# Patient Record
Sex: FEMALE | Race: WHITE | NOT HISPANIC OR LATINO | Employment: OTHER | URBAN - METROPOLITAN AREA
[De-identification: names, ages, dates, MRNs, and addresses within clinical notes are randomized per-mention and may not be internally consistent; named-entity substitution may affect disease eponyms.]

---

## 2019-12-05 ENCOUNTER — HOSPITAL ENCOUNTER (OUTPATIENT)
Dept: SURGERY | Facility: HOSPITAL | Age: 69
Setting detail: HOSPITAL OUTPATIENT SURGERY
Discharge: HOME | End: 2019-12-05
Attending: OPHTHALMOLOGY

## 2019-12-05 LAB — GLUCOSE BLD-MCNC: 128 MG/DL (ref 65–99)

## 2019-12-12 ENCOUNTER — HOSPITAL ENCOUNTER (OUTPATIENT)
Dept: SURGERY | Facility: HOSPITAL | Age: 69
Setting detail: HOSPITAL OUTPATIENT SURGERY
Discharge: HOME | End: 2019-12-12
Attending: OPHTHALMOLOGY

## 2019-12-12 LAB — GLUCOSE BLD-MCNC: 110 MG/DL (ref 65–99)

## 2021-05-19 ENCOUNTER — HOSPITAL ENCOUNTER (OUTPATIENT)
Dept: OTHER | Facility: HOSPITAL | Age: 71
Discharge: HOME | End: 2021-05-19

## 2021-08-03 ENCOUNTER — OFFICE VISIT (OUTPATIENT)
Dept: CARDIOLOGY | Facility: CLINIC | Age: 71
End: 2021-08-03

## 2021-08-03 VITALS
SYSTOLIC BLOOD PRESSURE: 142 MMHG | BODY MASS INDEX: 44.57 KG/M2 | WEIGHT: 227 LBS | HEIGHT: 60 IN | HEART RATE: 88 BPM | DIASTOLIC BLOOD PRESSURE: 80 MMHG

## 2021-08-03 DIAGNOSIS — R06.09 DYSPNEA ON EXERTION: Primary | ICD-10-CM

## 2021-08-03 DIAGNOSIS — E66.01 MORBID OBESITY WITH BMI OF 40.0-44.9, ADULT (HCC): ICD-10-CM

## 2021-08-03 DIAGNOSIS — R07.89 CHEST TIGHTNESS: ICD-10-CM

## 2021-08-03 PROCEDURE — 93000 ELECTROCARDIOGRAM COMPLETE: CPT | Performed by: INTERNAL MEDICINE

## 2021-08-03 PROCEDURE — 99204 OFFICE O/P NEW MOD 45 MIN: CPT | Performed by: INTERNAL MEDICINE

## 2021-08-03 RX ORDER — SERTRALINE HYDROCHLORIDE 100 MG/1
TABLET, FILM COATED ORAL DAILY
COMMUNITY
Start: 2021-06-29

## 2021-08-03 RX ORDER — LOSARTAN POTASSIUM 50 MG/1
TABLET ORAL DAILY
COMMUNITY
Start: 2021-06-29

## 2021-08-03 RX ORDER — ATORVASTATIN CALCIUM 40 MG/1
TABLET, FILM COATED ORAL
COMMUNITY
Start: 2021-06-12

## 2021-08-03 RX ORDER — DICYCLOMINE HYDROCHLORIDE 10 MG/1
10 CAPSULE ORAL
COMMUNITY

## 2021-08-03 RX ORDER — PANTOPRAZOLE SODIUM 20 MG/1
20 TABLET, DELAYED RELEASE ORAL DAILY
COMMUNITY

## 2021-08-03 RX ORDER — AMLODIPINE BESYLATE 10 MG/1
TABLET ORAL
COMMUNITY
Start: 2021-06-29

## 2021-08-03 RX ORDER — SEMAGLUTIDE 1.34 MG/ML
0.5 INJECTION, SOLUTION SUBCUTANEOUS
COMMUNITY
Start: 2021-07-07 | End: 2023-03-24

## 2021-08-03 RX ORDER — CHLORTHALIDONE 25 MG/1
TABLET ORAL DAILY
COMMUNITY
Start: 2021-06-29

## 2021-08-03 RX ORDER — CHOLECALCIFEROL (VITAMIN D3) 125 MCG
CAPSULE ORAL DAILY
COMMUNITY

## 2021-08-03 NOTE — PATIENT INSTRUCTIONS
Calorie Counting for Weight Loss  Calories are units of energy. Your body needs a certain number of calories from food to keep going throughout the day. When you eat or drink more calories than your body needs, your body stores the extra calories mostly as fat. When you eat or drink fewer calories than your body needs, your body burns fat to get the energy it needs.  Calorie counting means keeping track of how many calories you eat and drink each day. Calorie counting can be helpful if you need to lose weight. If you eat fewer calories than your body needs, you should lose weight. Ask your health care provider what a healthy weight is for you.  For calorie counting to work, you will need to eat the right number of calories each day to lose a healthy amount of weight per week. A dietitian can help you figure out how many calories you need in a day and will suggest ways to reach your calorie goal.  · A healthy amount of weight to lose each week is usually 1-2 lb (0.5-0.9 kg). This usually means that your daily calorie intake should be reduced by 500-750 calories.  · Eating 1,200-1,500 calories a day can help most women lose weight.  · Eating 1,500-1,800 calories a day can help most men lose weight.  What do I need to know about calorie counting?  Work with your health care provider or dietitian to determine how many calories you should get each day. To meet your daily calorie goal, you will need to:  · Find out how many calories are in each food that you would like to eat. Try to do this before you eat.  · Decide how much of the food you plan to eat.  · Keep a food log. Do this by writing down what you ate and how many calories it had.  To successfully lose weight, it is important to balance calorie counting with a healthy lifestyle that includes regular activity.  Where do I find calorie information?    The number of calories in a food can be found on a Nutrition Facts label. If a food does not have a Nutrition Facts  label, try to look up the calories online or ask your dietitian for help.  Remember that calories are listed per serving. If you choose to have more than one serving of a food, you will have to multiply the calories per serving by the number of servings you plan to eat. For example, the label on a package of bread might say that a serving size is 1 slice and that there are 90 calories in a serving. If you eat 1 slice, you will have eaten 90 calories. If you eat 2 slices, you will have eaten 180 calories.  How do I keep a food log?  After each time that you eat, record the following in your food log as soon as possible:  · What you ate. Be sure to include toppings, sauces, and other extras on the food.  · How much you ate. This can be measured in cups, ounces, or number of items.  · How many calories were in each food and drink.  · The total number of calories in the food you ate.  Keep your food log near you, such as in a pocket-sized notebook or on an yareli or website on your mobile phone. Some programs will calculate calories for you and show you how many calories you have left to meet your daily goal.  What are some portion-control tips?  · Know how many calories are in a serving. This will help you know how many servings you can have of a certain food.  · Use a measuring cup to measure serving sizes. You could also try weighing out portions on a kitchen scale. With time, you will be able to estimate serving sizes for some foods.  · Take time to put servings of different foods on your favorite plates or in your favorite bowls and cups so you know what a serving looks like.  · Try not to eat straight from a food's packaging, such as from a bag or box. Eating straight from the package makes it hard to see how much you are eating and can lead to overeating. Put the amount you would like to eat in a cup or on a plate to make sure you are eating the right portion.  · Use smaller plates, glasses, and bowls for smaller  portions and to prevent overeating.  · Try not to multitask. For example, avoid watching TV or using your computer while eating. If it is time to eat, sit down at a table and enjoy your food. This will help you recognize when you are full. It will also help you be more mindful of what and how much you are eating.  What are tips for following this plan?  Reading food labels  · Check the calorie count compared with the serving size. The serving size may be smaller than what you are used to eating.  · Check the source of the calories. Try to choose foods that are high in protein, fiber, and vitamins, and low in saturated fat, trans fat, and sodium.  Shopping  · Read nutrition labels while you shop. This will help you make healthy decisions about which foods to buy.  · Pay attention to nutrition labels for low-fat or fat-free foods. These foods sometimes have the same number of calories or more calories than the full-fat versions. They also often have added sugar, starch, or salt to make up for flavor that was removed with the fat.  · Make a grocery list of lower-calorie foods and stick to it.  Cooking  · Try to cook your favorite foods in a healthier way. For example, try baking instead of frying.  · Use low-fat dairy products.  Meal planning  · Use more fruits and vegetables. One-half of your plate should be fruits and vegetables.  · Include lean proteins, such as chicken, turkey, and fish.  Lifestyle  Each week, aim to do one of the following:  · 150 minutes of moderate exercise, such as walking.  · 75 minutes of vigorous exercise, such as running.  General information  · Know how many calories are in the foods you eat most often. This will help you calculate calorie counts faster.  · Find a way of tracking calories that works for you. Get creative. Try different apps or programs if writing down calories does not work for you.  What foods should I eat?    · Eat nutritious foods. It is better to have a nutritious,  high-calorie food, such as an avocado, than a food with few nutrients, such as a bag of potato chips.  · Use your calories on foods and drinks that will fill you up and will not leave you hungry soon after eating.  ? Examples of foods that fill you up are nuts and nut butters, vegetables, lean proteins, and high-fiber foods such as whole grains. High-fiber foods are foods with more than 5 g of fiber per serving.  · Pay attention to calories in drinks. Low-calorie drinks include water and unsweetened drinks.  The items listed above may not be a complete list of foods and beverages you can eat. Contact a dietitian for more information.  What foods should I limit?  Limit foods or drinks that are not good sources of vitamins, minerals, or protein or that are high in unhealthy fats. These include:  · Candy.  · Other sweets.  · Sodas, specialty coffee drinks, alcohol, and juice.  The items listed above may not be a complete list of foods and beverages you should avoid. Contact a dietitian for more information.  How do I count calories when eating out?  · Pay attention to portions. Often, portions are much larger when eating out. Try these tips to keep portions smaller:  ? Consider sharing a meal instead of getting your own.  ? If you get your own meal, eat only half of it. Before you start eating, ask for a container and put half of your meal into it.  ? When available, consider ordering smaller portions from the menu instead of full portions.  · Pay attention to your food and drink choices. Knowing the way food is cooked and what is included with the meal can help you eat fewer calories.  ? If calories are listed on the menu, choose the lower-calorie options.  ? Choose dishes that include vegetables, fruits, whole grains, low-fat dairy products, and lean proteins.  ? Choose items that are boiled, broiled, grilled, or steamed. Avoid items that are buttered, battered, fried, or served with cream sauce. Items labeled as  crispy are usually fried, unless stated otherwise.  ? Choose water, low-fat milk, unsweetened iced tea, or other drinks without added sugar. If you want an alcoholic beverage, choose a lower-calorie option, such as a glass of wine or light beer.  ? Ask for dressings, sauces, and syrups on the side. These are usually high in calories, so you should limit the amount you eat.  ? If you want a salad, choose a garden salad and ask for grilled meats. Avoid extra toppings such as espinoza, cheese, or fried items. Ask for the dressing on the side, or ask for olive oil and vinegar or lemon to use as dressing.  · Estimate how many servings of a food you are given. Knowing serving sizes will help you be aware of how much food you are eating at restaurants.  Where to find more information  · Centers for Disease Control and Prevention: www.cdc.gov  · [place] Department of Agriculture: myplate.gov  Summary  · Calorie counting means keeping track of how many calories you eat and drink each day. If you eat fewer calories than your body needs, you should lose weight.  · A healthy amount of weight to lose per week is usually 1-2 lb (0.5-0.9 kg). This usually means reducing your daily calorie intake by 500-750 calories.  · The number of calories in a food can be found on a Nutrition Facts label. If a food does not have a Nutrition Facts label, try to look up the calories online or ask your dietitian for help.  · Use smaller plates, glasses, and bowls for smaller portions and to prevent overeating.  · Use your calories on foods and drinks that will fill you up and not leave you hungry shortly after a meal.  This information is not intended to replace advice given to you by your health care provider. Make sure you discuss any questions you have with your health care provider.  Document Revised: 01/28/2021 Document Reviewed: 01/28/2021  Elsevier Patient Education © 2021 [employer]

## 2021-08-03 NOTE — PROGRESS NOTES
Chief Complaint  New Patient and Atheroschlerotic Disease    Subjective            Dona Abbott presents to [facility] CARDIOLOGY  History of Present Illness    70-year-old white female, she has a history of nonobstructive coronary disease by catheterization in 2013.  She has not been followed up since.  She reports shortness of breath and dyspnea which is worse with exertion such as walking the steps associated with chest tightness and tends to resolve with rest.  It is slowly progressive over many months.  She has a history of diabetes dyslipidemia hypertension.  She also has known sleep apnea but does not use CPAP therapy.    PMH  Past Medical History:   Diagnosis Date   • Chronic kidney disease    • Coronary artery disease    • Diabetes mellitus (CMS/[facility])    • Hyperlipidemia    • Hypertension    • Sleep apnea          SURGICALHX  Past Surgical History:   Procedure Laterality Date   • GALLBLADDER SURGERY     • KNEE SURGERY     • TONSILLECTOMY          SOC  Social History     Socioeconomic History   • Marital status:      Spouse name: Not on file   • Number of children: Not on file   • Years of education: Not on file   • Highest education level: Not on file   Tobacco Use   • Smoking status: Never Smoker   • Smokeless tobacco: Never Used   Vaping Use   • Vaping Use: Never used   Substance and Sexual Activity   • Alcohol use: Never   • Drug use: Never   • Sexual activity: Defer         FAMHX  Family History   Problem Relation Age of Onset   • No Known Problems Mother    • Heart attack Father    • Heart failure Father    • Hyperlipidemia Father    • Hypertension Father           ALLERGY  Allergies   Allergen Reactions   • Penicillins Swelling        MEDSCURRENT    Current Outpatient Medications:   •  amLODIPine (NORVASC) 10 MG tablet, , Disp: , Rfl:   •  atorvastatin (LIPITOR) 40 MG tablet, , Disp: , Rfl:   •  Calcium Carbonate (CALCIUM 600 PO), Take  by mouth., Disp: , Rfl:   •   "chlorthalidone (HYGROTON) 25 MG tablet, Daily., Disp: , Rfl:   •  Cholecalciferol (Vitamin D3) 50 MCG (2000 UT) tablet, Take  by mouth Daily., Disp: , Rfl:   •  dicyclomine (BENTYL) 10 MG capsule, Take 10 mg by mouth 4 (Four) Times a Day Before Meals & at Bedtime., Disp: , Rfl:   •  losartan (COZAAR) 50 MG tablet, Daily., Disp: , Rfl:   •  Magnesium 400 MG capsule, Take  by mouth Daily., Disp: , Rfl:   •  Multiple Vitamins-Minerals (PRESERVISION AREDS 2 PO), Take  by mouth., Disp: , Rfl:   •  Ozempic, 0.25 or 0.5 MG/DOSE, 2 MG/1.5ML solution pen-injector, Every 7 (Seven) Days., Disp: , Rfl:   •  pantoprazole (PROTONIX) 20 MG EC tablet, Take 20 mg by mouth Daily., Disp: , Rfl:   •  sertraline (ZOLOFT) 100 MG tablet, Daily. Take 1 1/2 tablets qd, Disp: , Rfl:       Review of Systems   Constitutional: Negative.   HENT: Negative.    Eyes: Negative.    Cardiovascular: Positive for chest pain and dyspnea on exertion.   Respiratory: Positive for shortness of breath.    Endocrine: Negative.    Hematologic/Lymphatic: Negative.    Skin: Negative.    Musculoskeletal: Positive for joint pain.   Gastrointestinal: Negative.    Genitourinary: Negative.    Neurological: Negative.    Psychiatric/Behavioral: Negative.         Objective     /80   Pulse 88   Ht 152.4 cm (60\")   Wt 103 kg (227 lb)   BMI 44.33 kg/m²       General Appearance:   · well developed  · well nourished morbidly obese  HENT:   · oropharynx moist  · lips not cyanotic  Neck:  · thyroid not enlarged  · supple  Respiratory:  · no respiratory distress  · normal breath sounds  · no rales  Cardiovascular:  · no jugular venous distention  · regular rhythm  · apical impulse normal  · S1 normal, S2 normal  · no S3, no S4   · no murmur  · no rub, no thrill  · carotid pulses normal; no bruit  · pedal pulses normal  · lower extremity edema: none    Musculoskeletal:  · no clubbing of fingers.   · normocephalic, head atraumatic  Skin:   · warm, " dry  Psychiatric:  · judgement and insight appropriate  · normal mood and affect      Result Review :                    ECG 12 Lead    Date/Time: 8/3/2021 4:14 PM  Performed by: ROBBI Dixon MD  Authorized by: ROBBI Dixon MD   Previous ECG: no previous ECG available  Rhythm: sinus rhythm  Conduction: conduction normal  ST Segments: ST segments normal  T Waves: T waves normal  QRS axis: normal  Other findings: non-specific ST-T wave changes and left ventricular hypertrophy    Clinical impression: non-specific ECG          Patient's Body mass index is 44.33 kg/m². indicating that she is obese (BMI >30). Obesity-related health conditions include the following: hypertension, diabetes mellitus and dyslipidemias. Obesity is unchanged. BMI is is above average; BMI management plan is completed. We discussed portion control and increasing exercise..           Assessment and Plan        ASSESSMENT:  Encounter Diagnoses   Name Primary?   • Dyspnea on exertion Yes   • Chest tightness    • Morbid obesity with BMI of 40.0-44.9, adult (CMS/[facility])          PLAN:    1.  Ms. Abbott has progressive dyspnea with occasional chest tightness.  Catheterization 2013 showed a 10 to 20% LAD otherwise was clear.  Her baseline EKG is unremarkable.  We will schedule an echo and stress imaging study to evaluate for progression of CAD.  If this appears low risk I think a strategy of weight loss and blood pressure control is going to need to be then strategy.  2.  Continue aspirin prevention, statin therapy  3.  We will follow her up in the office after diagnostic work-up to discuss further management, weight loss strategies.  She is agreeable with this plan          Patient was given instructions and counseling regarding her condition or for health maintenance advice. Please see specific information pulled into the AVS if appropriate.             ROBBI Dixon MD  8/3/2021    16:13 EDT

## 2021-08-19 ENCOUNTER — OUTSIDE FACILITY SERVICE (OUTPATIENT)
Dept: CARDIOLOGY | Facility: CLINIC | Age: 71
End: 2021-08-19

## 2021-08-19 PROCEDURE — 93306 TTE W/DOPPLER COMPLETE: CPT | Performed by: INTERNAL MEDICINE

## 2021-08-19 PROCEDURE — 93018 CV STRESS TEST I&R ONLY: CPT | Performed by: INTERNAL MEDICINE

## 2021-08-19 PROCEDURE — 78452 HT MUSCLE IMAGE SPECT MULT: CPT | Performed by: INTERNAL MEDICINE

## 2021-08-23 ENCOUNTER — TELEPHONE (OUTPATIENT)
Dept: CARDIOLOGY | Facility: CLINIC | Age: 71
End: 2021-08-23

## 2021-08-23 RX ORDER — ISOSORBIDE MONONITRATE 30 MG/1
30 TABLET, EXTENDED RELEASE ORAL DAILY
Qty: 90 TABLET | Refills: 3 | Status: SHIPPED | OUTPATIENT
Start: 2021-08-23 | End: 2021-10-12

## 2021-08-23 NOTE — TELEPHONE ENCOUNTER
Stress test abnormal, suggesting possibly partially blocked artery front wall of the heart.    I am going to start her on a low-dose of a long-acting nitrate to try to reduce symptoms.  If this is ineffective we can consider cardiac catheterization to specifically take pictures of her arteries to see if there is significant blockage.    Please make a follow-up on GUDELIA's schedule in 2 to 3 weeks to see us back.    Giovany Dixon MD

## 2021-08-24 NOTE — TELEPHONE ENCOUNTER
Called and discussed SPECT with patient.      2 week follow up appt made for 9/7.    Patient states she is having 4 teeth pulled on 9/8 with anesthesia. Patient asking if she needs to postpone?

## 2021-08-24 NOTE — TELEPHONE ENCOUNTER
MARE informing patient that she can proceed with oral surgery.  I will send a clearance to Dr. Nelson.

## 2021-08-30 ENCOUNTER — TELEPHONE (OUTPATIENT)
Dept: CARDIOLOGY | Facility: CLINIC | Age: 71
End: 2021-08-30

## 2021-08-30 NOTE — TELEPHONE ENCOUNTER
"Received VM from patient.    She states since starting Imdur she has been \"lethargic.\"   Patient is requesting to stop medication or get an alternative.  She has a follow up visit on 9-7.  "

## 2021-08-30 NOTE — TELEPHONE ENCOUNTER
Please put the patient on Priya's schedule for tomorrow in [place] so we can evaluate her    Ultimately I think the patient probably needs to have a cardiac catheterization to get more specific information regarding her coronary anatomy    BD

## 2021-08-31 ENCOUNTER — OFFICE VISIT (OUTPATIENT)
Dept: CARDIOLOGY | Facility: CLINIC | Age: 71
End: 2021-08-31

## 2021-08-31 ENCOUNTER — PREP FOR SURGERY (OUTPATIENT)
Dept: OTHER | Facility: HOSPITAL | Age: 71
End: 2021-08-31

## 2021-08-31 VITALS
WEIGHT: 223 LBS | BODY MASS INDEX: 43.78 KG/M2 | HEART RATE: 74 BPM | DIASTOLIC BLOOD PRESSURE: 86 MMHG | HEIGHT: 60 IN | SYSTOLIC BLOOD PRESSURE: 128 MMHG

## 2021-08-31 DIAGNOSIS — E78.2 MIXED HYPERLIPIDEMIA: ICD-10-CM

## 2021-08-31 DIAGNOSIS — R07.2 PRECORDIAL PAIN: ICD-10-CM

## 2021-08-31 DIAGNOSIS — R94.39 ABNORMAL NUCLEAR STRESS TEST: ICD-10-CM

## 2021-08-31 DIAGNOSIS — I10 ESSENTIAL HYPERTENSION: ICD-10-CM

## 2021-08-31 DIAGNOSIS — R06.02 SHORTNESS OF BREATH: ICD-10-CM

## 2021-08-31 DIAGNOSIS — I25.110 CORONARY ARTERY DISEASE INVOLVING NATIVE CORONARY ARTERY OF NATIVE HEART WITH UNSTABLE ANGINA PECTORIS (HCC): Primary | ICD-10-CM

## 2021-08-31 DIAGNOSIS — I20.9 ANGINA PECTORIS (HCC): Primary | ICD-10-CM

## 2021-08-31 PROCEDURE — 99214 OFFICE O/P EST MOD 30 MIN: CPT | Performed by: NURSE PRACTITIONER

## 2021-08-31 RX ORDER — RANOLAZINE 500 MG/1
500 TABLET, EXTENDED RELEASE ORAL 2 TIMES DAILY
Qty: 60 TABLET | Refills: 2 | Status: SHIPPED | OUTPATIENT
Start: 2021-08-31 | End: 2023-03-24

## 2021-08-31 RX ORDER — METOPROLOL SUCCINATE 25 MG/1
25 TABLET, EXTENDED RELEASE ORAL DAILY
Qty: 30 TABLET | Refills: 11 | Status: SHIPPED | OUTPATIENT
Start: 2021-08-31 | End: 2021-10-12

## 2021-08-31 NOTE — PROGRESS NOTES
Chief Complaint  Follow-up and ABN SPECT    Subjective            Dona Abbott presents to [facility] CARDIOLOGY  History of Present Illness    Dona Abbott is a 70-year-old white/ female here today for follow-up evaluation and management of chest pain, shortness of breath, fatigue.  She has a history of nonobstructive coronary artery disease, she had a cardiac cath in 2013 which showed a 10 to 20% LAD but was otherwise clear.  She recently had a CT chest which showed extensive coronary artery disease.  After her last visit she underwent an echocardiogram and stress imaging.  Her stress was abnormal with a medium sized moderately intense reversible defect in the basal to mid anterior wall.  She is having symptoms consistent with typical angina including exertional pressure and chest pain, shortness of breath, and overall fatigue that has progressively worsened over the past couple of months.  She has a history of hypertension, dyslipidemia, diabetes, obstructive sleep apnea.  She has been taking Imdur but was only able to take this for about 4 days due to what she reports as worsened symptoms after starting the medication.     PMH  Past Medical History:   Diagnosis Date   • Chronic kidney disease    • Coronary artery disease    • Diabetes mellitus (CMS/HCC)    • Hyperlipidemia    • Hypertension    • Sleep apnea          SURGICALHX  Past Surgical History:   Procedure Laterality Date   • GALLBLADDER SURGERY     • KNEE SURGERY     • TONSILLECTOMY          SOC  Social History     Socioeconomic History   • Marital status:      Spouse name: Not on file   • Number of children: Not on file   • Years of education: Not on file   • Highest education level: Not on file   Tobacco Use   • Smoking status: Never Smoker   • Smokeless tobacco: Never Used   Vaping Use   • Vaping Use: Never used   Substance and Sexual Activity   • Alcohol use: Never   • Drug use: Never   • Sexual activity: Defer  "        FAMHX  Family History   Problem Relation Age of Onset   • No Known Problems Mother    • Heart attack Father    • Heart failure Father    • Hyperlipidemia Father    • Hypertension Father           ALLERGY  Allergies   Allergen Reactions   • Penicillins Swelling        MEDSCURRENT    Current Outpatient Medications:   •  amLODIPine (NORVASC) 10 MG tablet, , Disp: , Rfl:   •  atorvastatin (LIPITOR) 40 MG tablet, , Disp: , Rfl:   •  Calcium Carbonate (CALCIUM 600 PO), Take  by mouth., Disp: , Rfl:   •  chlorthalidone (HYGROTON) 25 MG tablet, Daily., Disp: , Rfl:   •  Cholecalciferol (Vitamin D3) 50 MCG (2000 UT) tablet, Take  by mouth Daily., Disp: , Rfl:   •  dicyclomine (BENTYL) 10 MG capsule, Take 10 mg by mouth 4 (Four) Times a Day Before Meals & at Bedtime., Disp: , Rfl:   •  isosorbide mononitrate (IMDUR) 30 MG 24 hr tablet, Take 1 tablet by mouth Daily., Disp: 90 tablet, Rfl: 3  •  losartan (COZAAR) 50 MG tablet, Daily., Disp: , Rfl:   •  Magnesium 400 MG capsule, Take  by mouth Daily., Disp: , Rfl:   •  Multiple Vitamins-Minerals (PRESERVISION AREDS 2 PO), Take  by mouth., Disp: , Rfl:   •  Ozempic, 0.25 or 0.5 MG/DOSE, 2 MG/1.5ML solution pen-injector, Every 7 (Seven) Days., Disp: , Rfl:   •  pantoprazole (PROTONIX) 20 MG EC tablet, Take 20 mg by mouth Daily., Disp: , Rfl:   •  sertraline (ZOLOFT) 100 MG tablet, Daily. Take 1 1/2 tablets qd, Disp: , Rfl:   •  metoprolol succinate XL (TOPROL-XL) 25 MG 24 hr tablet, Take 1 tablet by mouth Daily., Disp: 30 tablet, Rfl: 11  •  ranolazine (Ranexa) 500 MG 12 hr tablet, Take 1 tablet by mouth 2 (Two) Times a Day., Disp: 60 tablet, Rfl: 2      Review of Systems   Constitutional: Positive for malaise/fatigue.   Cardiovascular: Positive for chest pain and dyspnea on exertion.   Respiratory: Positive for shortness of breath.         Objective     /86   Pulse 74   Ht 152.4 cm (60\")   Wt 101 kg (223 lb)   BMI 43.55 kg/m²       General Appearance:   · well " developed  · well nourished, obese  HENT:   · oropharynx moist  · lips not cyanotic  Neck:  · thyroid not enlarged  · supple  Respiratory:  · no respiratory distress  · normal breath sounds  · no rales  Cardiovascular:  · no jugular venous distention  · regular rhythm  · apical impulse normal  · S1 normal, S2 normal  · no S3, no S4   · no murmur  · no rub, no thrill  · carotid pulses normal; no bruit  · pedal pulses normal  · lower extremity edema: none    Musculoskeletal:  · no clubbing of fingers.   · normocephalic, head atraumatic  Skin:   · warm, dry  Psychiatric:  · judgement and insight appropriate  · normal mood and affect      Result Review :             Data reviewed: Echocardiogram-grade 2 diastolic dysfunction.  Stress imaging and CT chest reviewed as above.                Assessment and Plan        ASSESSMENT:  Encounter Diagnoses   Name Primary?   • Coronary artery disease involving native coronary artery of native heart with unstable angina pectoris (CMS/HCC) Yes   • Precordial pain    • Essential hypertension    • Mixed hyperlipidemia    • Shortness of breath          PLAN:    1.  A cardiac catheterization procedure will be scheduled for Dona as her symptoms are progressive, typical, and work-up also was abnormal.   2.  Metoprolol succinate 25 mg daily and Ranexa 500 mg twice daily started.    Follow-up will be scheduled after her catheterization procedure.       Patient was given instructions and counseling regarding her condition or for health maintenance advice. Please see specific information pulled into the AVS if appropriate.           Radhika Cervantes, APRN   9/8/2021  10:52 EDT

## 2021-09-16 ENCOUNTER — HOSPITAL ENCOUNTER (OUTPATIENT)
Facility: HOSPITAL | Age: 71
Setting detail: HOSPITAL OUTPATIENT SURGERY
Discharge: HOME | End: 2021-09-16
Attending: INTERNAL MEDICINE | Admitting: INTERNAL MEDICINE

## 2021-09-16 ENCOUNTER — TELEPHONE (OUTPATIENT)
Dept: CARDIOLOGY | Facility: CLINIC | Age: 71
End: 2021-09-16

## 2021-09-16 VITALS
HEIGHT: 60 IN | SYSTOLIC BLOOD PRESSURE: 112 MMHG | OXYGEN SATURATION: 95 % | RESPIRATION RATE: 20 BRPM | WEIGHT: 224.21 LBS | TEMPERATURE: 97.8 F | HEART RATE: 66 BPM | BODY MASS INDEX: 44.02 KG/M2 | DIASTOLIC BLOOD PRESSURE: 61 MMHG

## 2021-09-16 DIAGNOSIS — I20.9 ANGINA PECTORIS (HCC): ICD-10-CM

## 2021-09-16 DIAGNOSIS — R94.39 ABNORMAL NUCLEAR STRESS TEST: ICD-10-CM

## 2021-09-16 LAB
ANION GAP SERPL CALCULATED.3IONS-SCNC: 11 MMOL/L (ref 5–15)
BUN SERPL-MCNC: 33 MG/DL (ref 8–23)
BUN/CREAT SERPL: 20.2 (ref 7–25)
CALCIUM SPEC-SCNC: 9.4 MG/DL (ref 8.6–10.5)
CHLORIDE SERPL-SCNC: 104 MMOL/L (ref 98–107)
CO2 SERPL-SCNC: 25 MMOL/L (ref 22–29)
CREAT SERPL-MCNC: 1.63 MG/DL (ref 0.57–1)
DEPRECATED RDW RBC AUTO: 43.9 FL (ref 37–54)
ERYTHROCYTE [DISTWIDTH] IN BLOOD BY AUTOMATED COUNT: 14.1 % (ref 12.3–15.4)
GFR SERPL CREATININE-BSD FRML MDRD: 31 ML/MIN/1.73
GLUCOSE SERPL-MCNC: 151 MG/DL (ref 65–99)
HCT VFR BLD AUTO: 39.7 % (ref 34–46.6)
HGB BLD-MCNC: 12.7 G/DL (ref 12–15.9)
INR PPP: 0.98 (ref 2–3)
MCH RBC QN AUTO: 27.7 PG (ref 26.6–33)
MCHC RBC AUTO-ENTMCNC: 32 G/DL (ref 31.5–35.7)
MCV RBC AUTO: 86.5 FL (ref 79–97)
PLATELET # BLD AUTO: 142 10*3/MM3 (ref 140–450)
PMV BLD AUTO: 10.2 FL (ref 6–12)
POTASSIUM SERPL-SCNC: 4.6 MMOL/L (ref 3.5–5.2)
PROTHROMBIN TIME: 10.8 SECONDS (ref 9.4–12)
QT INTERVAL: 419 MS
RBC # BLD AUTO: 4.59 10*6/MM3 (ref 3.77–5.28)
SODIUM SERPL-SCNC: 140 MMOL/L (ref 136–145)
WBC # BLD AUTO: 5.49 10*3/MM3 (ref 3.4–10.8)

## 2021-09-16 PROCEDURE — 25010000002 HEPARIN (PORCINE) PER 1000 UNITS: Performed by: INTERNAL MEDICINE

## 2021-09-16 PROCEDURE — 93458 L HRT ARTERY/VENTRICLE ANGIO: CPT | Performed by: INTERNAL MEDICINE

## 2021-09-16 PROCEDURE — 93005 ELECTROCARDIOGRAM TRACING: CPT | Performed by: INTERNAL MEDICINE

## 2021-09-16 PROCEDURE — 25010000002 MIDAZOLAM PER 1MG: Performed by: INTERNAL MEDICINE

## 2021-09-16 PROCEDURE — 85610 PROTHROMBIN TIME: CPT | Performed by: INTERNAL MEDICINE

## 2021-09-16 PROCEDURE — 25010000002 FENTANYL CITRATE (PF) 100 MCG/2ML SOLUTION: Performed by: INTERNAL MEDICINE

## 2021-09-16 PROCEDURE — 99152 MOD SED SAME PHYS/QHP 5/>YRS: CPT | Performed by: INTERNAL MEDICINE

## 2021-09-16 PROCEDURE — C1769 GUIDE WIRE: HCPCS | Performed by: INTERNAL MEDICINE

## 2021-09-16 PROCEDURE — C1894 INTRO/SHEATH, NON-LASER: HCPCS | Performed by: INTERNAL MEDICINE

## 2021-09-16 PROCEDURE — 80048 BASIC METABOLIC PNL TOTAL CA: CPT | Performed by: INTERNAL MEDICINE

## 2021-09-16 PROCEDURE — 0 IOPAMIDOL PER 1 ML: Performed by: INTERNAL MEDICINE

## 2021-09-16 PROCEDURE — 85027 COMPLETE CBC AUTOMATED: CPT | Performed by: INTERNAL MEDICINE

## 2021-09-16 RX ORDER — MIDAZOLAM HYDROCHLORIDE 2 MG/2ML
INJECTION, SOLUTION INTRAMUSCULAR; INTRAVENOUS AS NEEDED
Status: DISCONTINUED | OUTPATIENT
Start: 2021-09-16 | End: 2021-09-16 | Stop reason: HOSPADM

## 2021-09-16 RX ORDER — FENTANYL CITRATE 50 UG/ML
INJECTION, SOLUTION INTRAMUSCULAR; INTRAVENOUS AS NEEDED
Status: DISCONTINUED | OUTPATIENT
Start: 2021-09-16 | End: 2021-09-16 | Stop reason: HOSPADM

## 2021-09-16 RX ORDER — SODIUM CHLORIDE 9 MG/ML
INJECTION, SOLUTION INTRAVENOUS CONTINUOUS PRN
Status: COMPLETED | OUTPATIENT
Start: 2021-09-16 | End: 2021-09-16

## 2021-09-16 RX ORDER — HEPARIN SODIUM 1000 [USP'U]/ML
INJECTION, SOLUTION INTRAVENOUS; SUBCUTANEOUS AS NEEDED
Status: DISCONTINUED | OUTPATIENT
Start: 2021-09-16 | End: 2021-09-16 | Stop reason: HOSPADM

## 2021-09-16 RX ORDER — VERAPAMIL HYDROCHLORIDE 2.5 MG/ML
INJECTION, SOLUTION INTRAVENOUS AS NEEDED
Status: DISCONTINUED | OUTPATIENT
Start: 2021-09-16 | End: 2021-09-16 | Stop reason: HOSPADM

## 2021-09-16 RX ORDER — LIDOCAINE HYDROCHLORIDE 20 MG/ML
INJECTION, SOLUTION INFILTRATION; PERINEURAL AS NEEDED
Status: DISCONTINUED | OUTPATIENT
Start: 2021-09-16 | End: 2021-09-16 | Stop reason: HOSPADM

## 2021-09-16 RX ORDER — ACETAMINOPHEN 325 MG/1
650 TABLET ORAL EVERY 4 HOURS PRN
Status: CANCELLED | OUTPATIENT
Start: 2021-09-16

## 2021-09-16 NOTE — INTERVAL H&P NOTE
H&P reviewed. The patient was examined and there are no changes to the H&P.      Proceed with [facility]    Giovany Dixon MD

## 2021-09-16 NOTE — DISCHARGE INSTRUCTIONS
You will be contacted by Dr. Dixon's office for follow-up.            Radial Site Care    This sheet gives you information about how to care for yourself after your procedure. Your health care provider may also give you more specific instructions. If you have problems or questions, contact your health care provider.  What can I expect after the procedure?  After the procedure, it is common to have:  · Bruising and tenderness at the catheter insertion area.  Follow these instructions at home:  Medicines  · Take over-the-counter and prescription medicines only as told by your health care provider.  Insertion site care  · Follow instructions from your health care provider about how to take care of your insertion site. Make sure you:  ? Wash your hands with soap and water before you change your bandage (dressing). If soap and water are not available, use hand .  ? Change your dressing as told by your health care provider.  ? Leave stitches (sutures), skin glue, or adhesive strips in place. These skin closures may need to stay in place for 2 weeks or longer. If adhesive strip edges start to loosen and curl up, you may trim the loose edges. Do not remove adhesive strips completely unless your health care provider tells you to do that.  · Check your insertion site every day for signs of infection. Check for:  ? Redness, swelling, or pain.  ? Fluid or blood.  ? Pus or a bad smell.  ? Warmth.  · Do not take baths, swim, or use a hot tub until your health care provider approves.  · You may shower 24-48 hours after the procedure, or as directed by your health care provider.  ? Remove the dressing and gently wash the site with plain soap and water.  ? Pat the area dry with a clean towel.  ? Do not rub the site. That could cause bleeding.  · Do not apply powder or lotion to the site.  Activity    · For 24 hours after the procedure, or as directed by your health care provider:  ? Do not flex or bend the affected  arm.  ? Do not push or pull heavy objects with the affected arm.  ? Do not drive yourself home from the hospital or clinic. You may drive 24 hours after the procedure unless your health care provider tells you not to.  ? Do not operate machinery or power tools.  · Do not lift anything that is heavier than 10 lb (4.5 kg), or the limit that you are told, until your health care provider says that it is safe.  · Ask your health care provider when it is okay to:  ? Return to work or school.  ? Resume usual physical activities or sports.  ? Resume sexual activity.    General instructions  · If the catheter site starts to bleed, raise your arm and put firm pressure on the site. If the bleeding does not stop, get help right away. This is a medical emergency.  · If you went home on the same day as your procedure, a responsible adult should be with you for the first 24 hours after you arrive home.  · Keep all follow-up visits as told by your health care provider. This is important.  Contact a health care provider if:  · You have a fever.  · You have redness, swelling, or yellow drainage around your insertion site.  Get help right away if:  · You have unusual pain at the radial site.  · The catheter insertion area swells very fast.  · The insertion area is bleeding, and the bleeding does not stop when you hold steady pressure on the area.  · Your arm or hand becomes pale, cool, tingly, or numb.  These symptoms may represent a serious problem that is an emergency. Do not wait to see if the symptoms will go away. Get medical help right away. Call your local emergency services (911 in the [place] Do not drive yourself to the hospital.  Summary  · After the procedure, it is common to have bruising and tenderness at the site.  · Follow instructions from your health care provider about how to take care of your radial site wound. Check the wound every day for signs of infection.  · Do not lift anything that is heavier than 10 lb (4.5  kg), or the limit that you are told, until your health care provider says that it is safe.  This information is not intended to replace advice given to you by your health care provider. Make sure you discuss any questions you have with your health care provider.  Document Revised: 01/23/2019 Document Reviewed: 01/23/2019  [employer] Patient Education © 2021 [employer]

## 2021-09-16 NOTE — TELEPHONE ENCOUNTER
----- Message from ROBBI Dixon MD sent at 9/16/2021 11:55 AM EDT -----  This patient had a cardiac cath today and is going homeShe needs follow-up in 3 weeks in [place] office with Priya, I could not enter that order into epic since [facility]

## 2021-10-12 ENCOUNTER — OFFICE VISIT (OUTPATIENT)
Dept: CARDIOLOGY | Facility: CLINIC | Age: 71
End: 2021-10-12

## 2021-10-12 VITALS
DIASTOLIC BLOOD PRESSURE: 75 MMHG | WEIGHT: 225 LBS | BODY MASS INDEX: 44.17 KG/M2 | SYSTOLIC BLOOD PRESSURE: 122 MMHG | HEIGHT: 60 IN | HEART RATE: 68 BPM

## 2021-10-12 DIAGNOSIS — E78.2 MIXED HYPERLIPIDEMIA: ICD-10-CM

## 2021-10-12 DIAGNOSIS — I25.110 CORONARY ARTERY DISEASE INVOLVING NATIVE CORONARY ARTERY OF NATIVE HEART WITH UNSTABLE ANGINA PECTORIS (HCC): Primary | ICD-10-CM

## 2021-10-12 DIAGNOSIS — R07.2 PRECORDIAL PAIN: ICD-10-CM

## 2021-10-12 DIAGNOSIS — I10 ESSENTIAL HYPERTENSION: ICD-10-CM

## 2021-10-12 PROCEDURE — 99214 OFFICE O/P EST MOD 30 MIN: CPT | Performed by: NURSE PRACTITIONER

## 2021-10-12 RX ORDER — METOPROLOL SUCCINATE 25 MG/1
12.5 TABLET, EXTENDED RELEASE ORAL DAILY
Qty: 30 TABLET | Refills: 11 | Status: SHIPPED | OUTPATIENT
Start: 2021-10-12 | End: 2023-03-24

## 2021-10-12 NOTE — PROGRESS NOTES
Chief Complaint  Follow-up (3 WK POST CATH)    Subjective            Dona Abbott presents to [facility] CARDIOLOGY  History of Present Illness    Dona is a 70-year-old white/ female here today for follow-up after recent cardiac catheterization.  Dona was having some symptoms consistent with typical angina including exertional pressure and chest pain and shortness of breath, with progressive fatigue.  She has a history of nonobstructive coronary artery disease.  Cardiac catheterization performed last month showed mild nonobstructive coronary artery disease in the major epicardial arteries.  A small high marginal branch of the circumflex with 90% ostial stenosis but was a trivial sized vessel.  Normal left ventricular systolic function.  Today Dona reports improvement of symptoms however she is still having some chest discomfort that seems to also be associated with indigestion and the feeling of food getting stuck.  She has a consultation with gastroenterology next month.  She is still having some slight discomfort and shortness of breath when ambulating upstairs.  Last visit we discontinued Imdur as felt this made his symptoms worse.  She also reports since starting metoprolol succinate that she is feeling more fatigued.    PMH  Past Medical History:   Diagnosis Date   • Chronic kidney disease    • Coronary artery disease    • Diabetes mellitus (HCC)    • Hyperlipidemia    • Hypertension    • Sleep apnea          SURGICALHX  Past Surgical History:   Procedure Laterality Date   • CARDIAC CATHETERIZATION N/A 9/16/2021    Procedure: Left Heart Cath;  Surgeon: ROBBI Dixon MD;  [facility] CATH INVAS[facility];  Service: Cardiology;  Laterality: N/A;   • GALLBLADDER SURGERY     • KNEE SURGERY     • TONSILLECTOMY          SOC  Social History     Socioeconomic History   • Marital status:    Tobacco Use   • Smoking status: Never Smoker   • Smokeless tobacco: Never Used   Vaping  Use   • Vaping Use: Never used   Substance and Sexual Activity   • Alcohol use: Never   • Drug use: Never   • Sexual activity: Defer         FAMHX  Family History   Problem Relation Age of Onset   • No Known Problems Mother    • Heart attack Father    • Heart failure Father    • Hyperlipidemia Father    • Hypertension Father           ALLERGY  Allergies   Allergen Reactions   • Penicillins Swelling        MEDSCURRENT    Current Outpatient Medications:   •  amLODIPine (NORVASC) 10 MG tablet, , Disp: , Rfl:   •  atorvastatin (LIPITOR) 40 MG tablet, , Disp: , Rfl:   •  Calcium Carbonate (CALCIUM 600 PO), Take  by mouth., Disp: , Rfl:   •  chlorthalidone (HYGROTON) 25 MG tablet, Daily., Disp: , Rfl:   •  Cholecalciferol (Vitamin D3) 50 MCG (2000 UT) tablet, Take  by mouth Daily., Disp: , Rfl:   •  dicyclomine (BENTYL) 10 MG capsule, Take 10 mg by mouth 4 (Four) Times a Day Before Meals & at Bedtime., Disp: , Rfl:   •  losartan (COZAAR) 50 MG tablet, Daily., Disp: , Rfl:   •  Magnesium 400 MG capsule, Take  by mouth Daily., Disp: , Rfl:   •  metoprolol succinate XL (TOPROL-XL) 25 MG 24 hr tablet, Take 0.5 tablets by mouth Daily., Disp: 30 tablet, Rfl: 11  •  Multiple Vitamins-Minerals (PRESERVISION AREDS 2 PO), Take  by mouth., Disp: , Rfl:   •  Ozempic, 0.25 or 0.5 MG/DOSE, 2 MG/1.5ML solution pen-injector, 0.5 mg Every 7 (Seven) Days., Disp: , Rfl:   •  pantoprazole (PROTONIX) 20 MG EC tablet, Take 20 mg by mouth Daily., Disp: , Rfl:   •  ranolazine (Ranexa) 500 MG 12 hr tablet, Take 1 tablet by mouth 2 (Two) Times a Day., Disp: 60 tablet, Rfl: 2  •  sertraline (ZOLOFT) 100 MG tablet, Daily. Take 1 1/2 tablets qd, Disp: , Rfl:       Review of Systems   Constitutional: Positive for malaise/fatigue.   Cardiovascular: Positive for chest pain and dyspnea on exertion. Negative for irregular heartbeat and leg swelling.   Respiratory: Positive for shortness of breath.    Musculoskeletal: Positive for back pain.     "    Objective     /75   Pulse 68   Ht 152.4 cm (60\")   Wt 102 kg (225 lb)   BMI 43.94 kg/m²       General Appearance:   · well developed  · well nourished, obese  HENT:   · oropharynx moist  · lips not cyanotic  Neck:  · thyroid not enlarged  · supple  Respiratory:  · no respiratory distress  · normal breath sounds  · no rales  Cardiovascular:  · no jugular venous distention  · regular rhythm  · apical impulse normal  · S1 normal, S2 normal  · no S3, no S4   · no murmur  · no rub, no thrill  · carotid pulses normal; no bruit  · pedal pulses normal  · lower extremity edema: none    Musculoskeletal:  · no clubbing of fingers.   · normocephalic, head atraumatic  Skin:   · warm, dry  Psychiatric:  · judgement and insight appropriate  · normal mood and affect      Result Review :         CBC    CBC 9/16/21   WBC 5.49   RBC 4.59   Hemoglobin 12.7   Hematocrit 39.7   MCV 86.5   MCH 27.7   MCHC 32.0   RDW 14.1   Platelets 142               BMP    BMP 9/16/21   BUN 33 (A)   Creatinine 1.63 (A)   Sodium 140   Potassium 4.6   Chloride 104   CO2 25.0   Calcium 9.4   (A) Abnormal value              Data reviewed: Cardiology studies Cardiac catheterization notes reviewed as above. Echocardiogram reviewed- grade 2 diastolic dysfunction. Stress imaging reviewed, CT chest reviewed.  and Laboratory studies reviewed as above.            Dona Abbott  reports that she has never smoked. She has never used smokeless tobacco.. I have educated her on the risk of diseases from using tobacco products such as cancer, COPD and heart disease.                Assessment and Plan        ASSESSMENT:  Encounter Diagnoses   Name Primary?   • Coronary artery disease involving native coronary artery of native heart with unstable angina pectoris (HCC) Yes   • Precordial pain    • Essential hypertension    • Mixed hyperlipidemia          PLAN:    1.  Coronary artery disease-cardiac cath showing nonobstructive disease.  Will reduce the dose of " beta-blocker in order to hopefully alleviate some of the fatigue Dona is experiencing.  Continue Ranexa.  Continue daily aspirin.  2.  Precordial pain- Possibly also a GI etiology, GI consult next month. Continue Ranexa for angina-type pain.   3.  Hypertension- controlled.   4. Hyperlipidemia- stable on statin therapy.    Follow up in 6 months unless problems arise.       Patient was given instructions and counseling regarding her condition or for health maintenance advice. Please see specific information pulled into the AVS if appropriate.           Radhika Cervantes, APRN   10/12/2021  10:02 EDT

## 2023-03-24 ENCOUNTER — TELEPHONE (OUTPATIENT)
Dept: CARDIOLOGY | Facility: CLINIC | Age: 73
End: 2023-03-24

## 2023-03-24 ENCOUNTER — OFFICE VISIT (OUTPATIENT)
Dept: CARDIOLOGY | Facility: CLINIC | Age: 73
End: 2023-03-24
Payer: MEDICARE

## 2023-03-24 VITALS
HEART RATE: 92 BPM | DIASTOLIC BLOOD PRESSURE: 61 MMHG | HEIGHT: 60 IN | BODY MASS INDEX: 40.05 KG/M2 | SYSTOLIC BLOOD PRESSURE: 109 MMHG | WEIGHT: 204 LBS

## 2023-03-24 DIAGNOSIS — Z01.810 PREOP CARDIOVASCULAR EXAM: ICD-10-CM

## 2023-03-24 DIAGNOSIS — I10 ESSENTIAL HYPERTENSION: ICD-10-CM

## 2023-03-24 DIAGNOSIS — E78.2 MIXED HYPERLIPIDEMIA: ICD-10-CM

## 2023-03-24 DIAGNOSIS — I25.10 CORONARY ARTERY DISEASE INVOLVING NATIVE CORONARY ARTERY OF NATIVE HEART WITHOUT ANGINA PECTORIS: Primary | ICD-10-CM

## 2023-03-24 PROCEDURE — 1160F RVW MEDS BY RX/DR IN RCRD: CPT | Performed by: INTERNAL MEDICINE

## 2023-03-24 PROCEDURE — 1159F MED LIST DOCD IN RCRD: CPT | Performed by: INTERNAL MEDICINE

## 2023-03-24 PROCEDURE — 99214 OFFICE O/P EST MOD 30 MIN: CPT | Performed by: INTERNAL MEDICINE

## 2023-03-24 RX ORDER — DULAGLUTIDE 0.75 MG/.5ML
INJECTION, SOLUTION SUBCUTANEOUS
COMMUNITY
Start: 2023-01-19

## 2023-03-24 NOTE — TELEPHONE ENCOUNTER
Caller: Dona Abbott    Relationship: Self    Best call back number: [phone]    What is the best time to reach you: ANY    What was the call regarding: PT WAS CALLING TO LET DR LOPEZ AND HIS NURSE KNOW THAT PT WAS UNABLE TO GET THE EKG THAT SHE HAD PREFORMED ON 3.23.23 AND THAT THEY TOLD HER THEY EITHER AN E-MAIL NEEDS TO BE SENT OVER TO REQUEST IT OR TO GET A HOLD OF THE MEDICAL RECORDS. THE SITE WHERE SHE HAD THE EKG IS CLOSED ON [address]    Do you require a callback: IF NEEDED

## 2023-03-27 NOTE — TELEPHONE ENCOUNTER
LET PT KNOW THAT I HAVE REACHED OUT TO [facility] ON 2 OCCASIONS AND THEY STATE THE 3.23 EKG HASNT BEEN SCANNED IN TO THE SYSTEM YET AND ITS OUT OF THEIR CONTROL WITH PROCESSING FOR THAT.

## 2023-03-27 NOTE — PROGRESS NOTES
Chief Complaint  Shortness of Breath, Coronary artery disease involving native coronary artery of, Surgical Clearance, Abnormal ECG, and Hyperlipidemia    Subjective            Dona Abbott presents to [facility] CARDIOLOGY  History of Present Illness    Dona is here for follow-up evaluation management of nonobstructive coronary artery disease, hypertension, mixed hyperlipidemia.  She is going to be having orthopedic surgery upcoming.  Cardiac clearance was requested.  She had an EKG done recently which was apparently abnormal.  She denies chest pain palpitations or excessive shortness of breath.    PMH  Past Medical History:   Diagnosis Date   • Chronic kidney disease    • Coronary artery disease    • Diabetes mellitus (HCC)    • Hyperlipidemia    • Hypertension    • Sleep apnea          SURGICALHX  Past Surgical History:   Procedure Laterality Date   • CARDIAC CATHETERIZATION N/A 9/16/2021    Procedure: Left Heart Cath;  Surgeon: ROBBI Dixon MD;  Location: [facility] CATH INVASIVE LOCATION;  Service: Cardiology;  Laterality: N/A;   • GALLBLADDER SURGERY     • KNEE SURGERY     • TONSILLECTOMY          SOC  Social History     Socioeconomic History   • Marital status:    Tobacco Use   • Smoking status: Never   • Smokeless tobacco: Never   Vaping Use   • Vaping Use: Never used   Substance and Sexual Activity   • Alcohol use: Never   • Drug use: Never   • Sexual activity: Defer         FAMHX  Family History   Problem Relation Age of Onset   • No Known Problems Mother    • Heart attack Father    • Heart failure Father    • Hyperlipidemia Father    • Hypertension Father           ALLERGY  Allergies   Allergen Reactions   • Penicillins Swelling        MEDSCURRENT    Current Outpatient Medications:   •  amLODIPine (NORVASC) 10 MG tablet, , Disp: , Rfl:   •  atorvastatin (LIPITOR) 40 MG tablet, , Disp: , Rfl:   •  Calcium Carbonate (CALCIUM 600 PO), Take  by mouth., Disp: , Rfl:   •  chlorthalidone  "(HYGROTON) 25 MG tablet, Daily., Disp: , Rfl:   •  Cholecalciferol (Vitamin D3) 50 MCG (2000 UT) tablet, Take  by mouth Daily., Disp: , Rfl:   •  dicyclomine (BENTYL) 10 MG capsule, Take 1 capsule by mouth 4 (Four) Times a Day Before Meals & at Bedtime., Disp: , Rfl:   •  losartan (COZAAR) 50 MG tablet, Daily., Disp: , Rfl:   •  Magnesium 400 MG capsule, Take  by mouth Daily., Disp: , Rfl:   •  pantoprazole (PROTONIX) 20 MG EC tablet, Take 1 tablet by mouth Daily., Disp: , Rfl:   •  sertraline (ZOLOFT) 100 MG tablet, Daily. Take 1 1/2 tablets qd, Disp: , Rfl:   •  Trulicity 0.75 MG/0.5ML solution pen-injector, , Disp: , Rfl:       Review of Systems   Cardiovascular: Negative for chest pain.   Respiratory: Negative for shortness of breath.    Musculoskeletal: Positive for joint pain.        Objective     /61   Pulse 92   Ht 152.4 cm (60\")   Wt 92.5 kg (204 lb)   BMI 39.84 kg/m²       General Appearance:   · well developed  · well nourished  HENT:   · oropharynx moist  · lips not cyanotic  Neck:  · thyroid not enlarged  · supple  Respiratory:  · no respiratory distress  · normal breath sounds  · no rales  Cardiovascular:  · no jugular venous distention  · regular rhythm  · apical impulse normal  · S1 normal, S2 normal  · no S3, no S4   · no murmur  · no rub, no thrill  · carotid pulses normal; no bruit  · pedal pulses normal  · lower extremity edema: none    Musculoskeletal:  · no clubbing of fingers.   · normocephalic, head atraumatic  Skin:   · warm, dry  Psychiatric:  · judgement and insight appropriate  · normal mood and affect      Result Review :     The following data was reviewed by: Giovany Dixon MD on 03/24/2023:                      Data reviewed: Primary care records reviewed, laboratory studies reviewed EKG personally reviewed by me from last week, this showed sinus rhythm, LVH with no significant ST changes.    Procedures                Assessment and Plan  "       ASSESSMENT:  Encounter Diagnoses   Name Primary?   • Coronary artery disease involving native coronary artery of native heart without angina pectoris Yes   • Essential hypertension    • Mixed hyperlipidemia    • Preop cardiovascular exam          PLAN:    1.  Mild coronary artery disease-clinically stable, no reports of angina.  Continue current medical therapy  2.  Essential hypertension-stable, continue current medical therapy  3.  Mixed hyperlipidemia-stable, continue current medical therapy  4.  Preop clearance-based on the patient's history, planned procedure she is at relatively low cardiovascular risk.  I reviewed her most recent EKG which showed mild LVH.  We will notify her orthopedic surgeon    Follow-up in [place] clinic in 1 year          Patient was given instructions and counseling regarding her condition or for health maintenance advice. Please see specific information pulled into the AVS if appropriate.             ROBBI Dixon MD  3/27/2023    10:47 EDT

## 2023-03-28 ENCOUNTER — TELEPHONE (OUTPATIENT)
Dept: CARDIOLOGY | Facility: CLINIC | Age: 73
End: 2023-03-28
Payer: MEDICARE

## 2023-03-28 NOTE — TELEPHONE ENCOUNTER
Per Dr Dixon: This is a [place] patient, I saw her last week for pre-op clearance.  She is low cardiovascular risk for orthopedic surgery, please have cardiac clearance sent to Dr. Steven CABRERA 1950

## 2023-10-24 ENCOUNTER — TELEPHONE (OUTPATIENT)
Dept: CARDIOLOGY | Facility: CLINIC | Age: 73
End: 2023-10-24

## 2023-10-24 NOTE — TELEPHONE ENCOUNTER
Caller: Dona Abbott    Relationship to patient: Self    Best call back number: [phone]    Chief complaint: PT STATES SHE HAD A STRESS TEST DONE WITH PCP, AND THE RESULTS CAME BACK ABNORMAL, THEY ADVISED HER TO SCHEDULE WITH CARDIOLOGIST ASAP. PLEASE FOLLOW UP AND SCHEDULE. PT REQUEST TO BE SEEN BY DR. LOPEZ AND NOT APRN THIS GO AROUND.     Type of visit: FOLLOW UP    Requested date: ASAP

## 2023-10-31 ENCOUNTER — OFFICE VISIT (OUTPATIENT)
Dept: CARDIOLOGY | Facility: CLINIC | Age: 73
End: 2023-10-31
Payer: MEDICARE

## 2023-10-31 ENCOUNTER — PREP FOR SURGERY (OUTPATIENT)
Dept: OTHER | Facility: HOSPITAL | Age: 73
End: 2023-10-31
Payer: MEDICARE

## 2023-10-31 VITALS
BODY MASS INDEX: 40.64 KG/M2 | HEIGHT: 60 IN | SYSTOLIC BLOOD PRESSURE: 122 MMHG | HEART RATE: 98 BPM | DIASTOLIC BLOOD PRESSURE: 70 MMHG | WEIGHT: 207 LBS

## 2023-10-31 DIAGNOSIS — R94.39 ABNORMAL STRESS TEST: ICD-10-CM

## 2023-10-31 DIAGNOSIS — I10 HYPERTENSION, ESSENTIAL: ICD-10-CM

## 2023-10-31 DIAGNOSIS — I25.119 CORONARY ARTERY DISEASE INVOLVING NATIVE CORONARY ARTERY OF NATIVE HEART WITH ANGINA PECTORIS: Primary | ICD-10-CM

## 2023-10-31 DIAGNOSIS — E78.2 HYPERLIPEMIA, MIXED: ICD-10-CM

## 2023-10-31 PROCEDURE — 99214 OFFICE O/P EST MOD 30 MIN: CPT | Performed by: INTERNAL MEDICINE

## 2023-10-31 PROCEDURE — 1159F MED LIST DOCD IN RCRD: CPT | Performed by: INTERNAL MEDICINE

## 2023-10-31 PROCEDURE — 1160F RVW MEDS BY RX/DR IN RCRD: CPT | Performed by: INTERNAL MEDICINE

## 2023-10-31 RX ORDER — ASPIRIN 81 MG/1
81 TABLET ORAL DAILY
Qty: 99 TABLET | Refills: 99 | Status: SHIPPED | OUTPATIENT
Start: 2023-10-31

## 2023-10-31 RX ORDER — SODIUM CHLORIDE 9 MG/ML
40 INJECTION, SOLUTION INTRAVENOUS AS NEEDED
OUTPATIENT
Start: 2023-10-31

## 2023-10-31 RX ORDER — SODIUM CHLORIDE 0.9 % (FLUSH) 0.9 %
10 SYRINGE (ML) INJECTION EVERY 12 HOURS SCHEDULED
OUTPATIENT
Start: 2023-10-31

## 2023-10-31 RX ORDER — SODIUM CHLORIDE 0.9 % (FLUSH) 0.9 %
10 SYRINGE (ML) INJECTION AS NEEDED
OUTPATIENT
Start: 2023-10-31

## 2023-10-31 NOTE — H&P (VIEW-ONLY)
Chief Complaint  abnormal stress test     Subjective            Dona Abbott presents to [facility] CARDIOLOGY  History of Present Illness    Dona is here for follow-up evaluation management of coronary artery disease, managed medically, hypertension, mixed hyperlipidemia.  Recently she has had tightness in her chest, heaviness with tingling in her hands.  It is worse with exertion and at times has been severe.  She saw primary care recently and a plain treadmill test was ordered.  The test was equivocal with some nonspecific ST changes and moderate exercise capacity.  She has been compliant with her medical therapy.  Symptoms are persistent.    PMH  Past Medical History:   Diagnosis Date    Chronic kidney disease     Coronary artery disease     Diabetes mellitus     Hyperlipidemia     Hypertension     Sleep apnea          SURGICALHX  Past Surgical History:   Procedure Laterality Date    CARDIAC CATHETERIZATION N/A 9/16/2021    Procedure: Left Heart Cath;  Surgeon: ROBBI Dixon MD;  Location: [facility] CATH INVASIVE LOCATION;  Service: Cardiology;  Laterality: N/A;    GALLBLADDER SURGERY      KNEE SURGERY      TONSILLECTOMY          SOC  Social History     Socioeconomic History    Marital status:    Tobacco Use    Smoking status: Never    Smokeless tobacco: Never   Vaping Use    Vaping Use: Never used   Substance and Sexual Activity    Alcohol use: Never    Drug use: Never    Sexual activity: Defer         FAMHX  Family History   Problem Relation Age of Onset    No Known Problems Mother     Heart attack Father     Heart failure Father     Hyperlipidemia Father     Hypertension Father           ALLERGY  Allergies   Allergen Reactions    Levofloxacin Nausea Only    Penicillins Swelling        MEDSCURRENT    Current Outpatient Medications:     amLODIPine (NORVASC) 10 MG tablet, , Disp: , Rfl:     atorvastatin (LIPITOR) 40 MG tablet, , Disp: , Rfl:     Calcium Carbonate (CALCIUM 600 PO), Take   "by mouth., Disp: , Rfl:     chlorthalidone (HYGROTON) 25 MG tablet, Daily., Disp: , Rfl:     Cholecalciferol (Vitamin D3) 50 MCG (2000 UT) tablet, Take  by mouth Daily., Disp: , Rfl:     dicyclomine (BENTYL) 10 MG capsule, Take 1 capsule by mouth 4 (Four) Times a Day Before Meals & at Bedtime., Disp: , Rfl:     losartan (COZAAR) 50 MG tablet, Daily., Disp: , Rfl:     Magnesium 400 MG capsule, Take  by mouth Daily., Disp: , Rfl:     pantoprazole (PROTONIX) 20 MG EC tablet, Take 1 tablet by mouth Daily., Disp: , Rfl:     sertraline (ZOLOFT) 100 MG tablet, Daily. Take 1 1/2 tablets qd, Disp: , Rfl:     Trulicity 0.75 MG/0.5ML solution pen-injector, , Disp: , Rfl:     aspirin 81 MG EC tablet, Take 1 tablet by mouth Daily., Disp: 99 tablet, Rfl: 99      Review of Systems   Constitutional: Positive for malaise/fatigue.   HENT: Negative.     Eyes: Negative.    Cardiovascular:  Positive for chest pain and dyspnea on exertion.   Respiratory:  Positive for shortness of breath.    Endocrine: Negative.    Hematologic/Lymphatic: Negative.    Skin: Negative.    Musculoskeletal: Negative.    Gastrointestinal: Negative.    Genitourinary: Negative.    Neurological: Negative.    Psychiatric/Behavioral: Negative.          Objective     /70   Pulse 98   Ht 152.4 cm (60\")   Wt 93.9 kg (207 lb)   BMI 40.43 kg/m²       General Appearance:   well developed  well nourished  HENT:   oropharynx moist  lips not cyanotic  Neck:  thyroid not enlarged  supple  Respiratory:  no respiratory distress  normal breath sounds  no rales  Cardiovascular:  no jugular venous distention  regular rhythm  apical impulse normal  S1 normal, S2 normal  no S3, no S4   no murmur  no rub, no thrill  carotid pulses normal; no bruit  pedal pulses normal  lower extremity edema: none    Musculoskeletal:  no clubbing of fingers.   normocephalic, head atraumatic  Skin:   warm, dry  Psychiatric:  judgement and insight appropriate  normal mood and " affect      Result Review :             Data reviewed : Recent treadmill stress test showed 5 minutes 55 seconds of exercise with anterior lateral ST changes, somewhat nonspecific      Procedures                Assessment and Plan        ASSESSMENT:  Encounter Diagnoses   Name Primary?    Coronary artery disease involving native coronary artery of native heart with angina pectoris Yes    Hypertension, essential     Hyperlipemia, mixed     Abnormal stress test          PLAN:    1.  Coronary artery disease, native vessel with recent symptoms consistent with angina pectoris.  There is been a definite change in her symptom pattern.  Previous catheterization 2 years ago was done because of a false positive nuclear stress test.  She just recently had a treadmill test that was abnormal.  I recommend repeating coronary angiography for definitive assessment.  The risks and benefits were discussed with the patient and she is agreeable to proceed.  I asked her to resume low-dose aspirin as well today  2.  Essential hypertension-stable, continue current medical therapy  3.  Mixed hyperlipidemia-stable, continue statin therapy    Follow-up to be determined after coronary angiography          Patient was given instructions and counseling regarding her condition or for health maintenance advice. Please see specific information pulled into the AVS if appropriate.             ROBBI Dixon MD  10/31/2023    13:27 EDT

## 2023-10-31 NOTE — PROGRESS NOTES
Chief Complaint  abnormal stress test     Subjective            Dona Abbott presents to [facility] CARDIOLOGY  History of Present Illness    Dona is here for follow-up evaluation management of coronary artery disease, managed medically, hypertension, mixed hyperlipidemia.  Recently she has had tightness in her chest, heaviness with tingling in her hands.  It is worse with exertion and at times has been severe.  She saw primary care recently and a plain treadmill test was ordered.  The test was equivocal with some nonspecific ST changes and moderate exercise capacity.  She has been compliant with her medical therapy.  Symptoms are persistent.    PMH  Past Medical History:   Diagnosis Date    Chronic kidney disease     Coronary artery disease     Diabetes mellitus     Hyperlipidemia     Hypertension     Sleep apnea          SURGICALHX  Past Surgical History:   Procedure Laterality Date    CARDIAC CATHETERIZATION N/A 9/16/2021    Procedure: Left Heart Cath;  Surgeon: ROBBI Dixon MD;  Location: [facility] CATH INVASIVE LOCATION;  Service: Cardiology;  Laterality: N/A;    GALLBLADDER SURGERY      KNEE SURGERY      TONSILLECTOMY          SOC  Social History     Socioeconomic History    Marital status:    Tobacco Use    Smoking status: Never    Smokeless tobacco: Never   Vaping Use    Vaping Use: Never used   Substance and Sexual Activity    Alcohol use: Never    Drug use: Never    Sexual activity: Defer         FAMHX  Family History   Problem Relation Age of Onset    No Known Problems Mother     Heart attack Father     Heart failure Father     Hyperlipidemia Father     Hypertension Father           ALLERGY  Allergies   Allergen Reactions    Levofloxacin Nausea Only    Penicillins Swelling        MEDSCURRENT    Current Outpatient Medications:     amLODIPine (NORVASC) 10 MG tablet, , Disp: , Rfl:     atorvastatin (LIPITOR) 40 MG tablet, , Disp: , Rfl:     Calcium Carbonate (CALCIUM 600 PO), Take   "by mouth., Disp: , Rfl:     chlorthalidone (HYGROTON) 25 MG tablet, Daily., Disp: , Rfl:     Cholecalciferol (Vitamin D3) 50 MCG (2000 UT) tablet, Take  by mouth Daily., Disp: , Rfl:     dicyclomine (BENTYL) 10 MG capsule, Take 1 capsule by mouth 4 (Four) Times a Day Before Meals & at Bedtime., Disp: , Rfl:     losartan (COZAAR) 50 MG tablet, Daily., Disp: , Rfl:     Magnesium 400 MG capsule, Take  by mouth Daily., Disp: , Rfl:     pantoprazole (PROTONIX) 20 MG EC tablet, Take 1 tablet by mouth Daily., Disp: , Rfl:     sertraline (ZOLOFT) 100 MG tablet, Daily. Take 1 1/2 tablets qd, Disp: , Rfl:     Trulicity 0.75 MG/0.5ML solution pen-injector, , Disp: , Rfl:     aspirin 81 MG EC tablet, Take 1 tablet by mouth Daily., Disp: 99 tablet, Rfl: 99      Review of Systems   Constitutional: Positive for malaise/fatigue.   HENT: Negative.     Eyes: Negative.    Cardiovascular:  Positive for chest pain and dyspnea on exertion.   Respiratory:  Positive for shortness of breath.    Endocrine: Negative.    Hematologic/Lymphatic: Negative.    Skin: Negative.    Musculoskeletal: Negative.    Gastrointestinal: Negative.    Genitourinary: Negative.    Neurological: Negative.    Psychiatric/Behavioral: Negative.          Objective     /70   Pulse 98   Ht 152.4 cm (60\")   Wt 93.9 kg (207 lb)   BMI 40.43 kg/m²       General Appearance:   well developed  well nourished  HENT:   oropharynx moist  lips not cyanotic  Neck:  thyroid not enlarged  supple  Respiratory:  no respiratory distress  normal breath sounds  no rales  Cardiovascular:  no jugular venous distention  regular rhythm  apical impulse normal  S1 normal, S2 normal  no S3, no S4   no murmur  no rub, no thrill  carotid pulses normal; no bruit  pedal pulses normal  lower extremity edema: none    Musculoskeletal:  no clubbing of fingers.   normocephalic, head atraumatic  Skin:   warm, dry  Psychiatric:  judgement and insight appropriate  normal mood and " affect      Result Review :             Data reviewed : Recent treadmill stress test showed 5 minutes 55 seconds of exercise with anterior lateral ST changes, somewhat nonspecific      Procedures                Assessment and Plan        ASSESSMENT:  Encounter Diagnoses   Name Primary?    Coronary artery disease involving native coronary artery of native heart with angina pectoris Yes    Hypertension, essential     Hyperlipemia, mixed     Abnormal stress test          PLAN:    1.  Coronary artery disease, native vessel with recent symptoms consistent with angina pectoris.  There is been a definite change in her symptom pattern.  Previous catheterization 2 years ago was done because of a false positive nuclear stress test.  She just recently had a treadmill test that was abnormal.  I recommend repeating coronary angiography for definitive assessment.  The risks and benefits were discussed with the patient and she is agreeable to proceed.  I asked her to resume low-dose aspirin as well today  2.  Essential hypertension-stable, continue current medical therapy  3.  Mixed hyperlipidemia-stable, continue statin therapy    Follow-up to be determined after coronary angiography          Patient was given instructions and counseling regarding her condition or for health maintenance advice. Please see specific information pulled into the AVS if appropriate.             ROBBI Dixon MD  10/31/2023    13:27 EDT

## 2023-11-03 ENCOUNTER — TELEPHONE (OUTPATIENT)
Dept: CARDIOLOGY | Facility: CLINIC | Age: 73
End: 2023-11-03
Payer: MEDICARE

## 2023-11-03 NOTE — TELEPHONE ENCOUNTER
I spoke to patient and gave an arrival time of 7:00 on 11/09/23 for [facility]. Patient was instructed to have a  for the day of the procedure and to arrive at the main entrance/registration area. Patient was educated about stent placement and informed that in the event of stent placement, the patient will be required to stay overnight for observation. Patient was instructed to continue all medications as usual. Patient was instructed to be NPO after midnight with sips of water as needed. Patient asked to have pre-procedure labs drawn at [facility]. I have faxed these labs to be collected. Patient is agreeable with no other questions or concerns.

## 2023-11-07 RX ORDER — ANTIOX #8/OM3/DHA/EPA/LUT/ZEAX 250-2.5 MG
1 CAPSULE ORAL 2 TIMES DAILY
COMMUNITY

## 2023-11-07 NOTE — PRE-PROCEDURE INSTRUCTIONS
PATIENT INSTRUCTED TO BE:    - NPO AFTER MIDNIGHT EXCEPT CAN HAVE SIPS OF WATER WITH HIS MEDICATION PRIOR TO PROCEDURE    -  INSTRUCTED NO LOTIONS, JEWELRY, PIERCINGS, OR DEODORANT DAY OF THE PROCEDURE    - INSTRUCTED TO TAKE THE FOLLOWING MEDICATIONS THE DAY OF SURGERY:  NORVASC, ASA, LIPITOR, VITAMINS, CHLORTHALIDONE, BENTYL, MAGNESIUM, LOSARTAN, ZOLOFT          - INSTRUCTED PT TO FOLLOW ANY INSTRUCTIONS GIVEN BY HIS CARDIOLOGIST.    - INFORMED PT THEY ATTEMPT RADIAL APPROACH FIRST IF UNABLE TO PERFORM CATH WITH THAT APPROACH THEY WILL DO A FEMORAL APPROACH.  ALSO, INFORMED PT THEY WILL BE ON BEDREST POSTOP.  IF THE SURGEON FEELS  AN INTERVENTION OR STENTS NEEDS TO BE PLACED, HE/SHE WILL STAY OVER NIGHT FOR OBSERVATION AND MONITORING.     - INFORMED THE PATIENT HE CAN HAVE TWO VISITORS WITH HIM THE DAY OF THE PROCEDURE    - INSTRUCTED PT TO PARK IN THE PARKING GARAGE, ENTER THE HOSPITAL THRU ENTRANCE A AND  CHECK IN AT THE MAIN REGISTRATION DESK, AFTER REGISTRATION PT WILL BE TAKEN THE THE PREOP AREA FOR HIS PROCEDURE.    -ARRIVAL TIME GIVEN BY CARDIOLOGIST OFFICE, INFORMED PT IF ARRIVAL TIME CHANGES OR ADJUSTMENTS NEED TO BE MADE IN THEIR ARRIVAL TIME, HE/SHE WOULD RECEIVE A CALL.    -INSTRUCTED PT TO COME TO [facility] TO GET THEIR LABS/ EKG DONE PRIOR TO PROCEDURE      - PATIENT VERBALIZED UNDERSTANDING

## 2023-11-09 ENCOUNTER — HOSPITAL ENCOUNTER (OUTPATIENT)
Facility: HOSPITAL | Age: 73
Setting detail: HOSPITAL OUTPATIENT SURGERY
Discharge: HOME | End: 2023-11-09
Attending: INTERNAL MEDICINE | Admitting: INTERNAL MEDICINE
Payer: MEDICARE

## 2023-11-09 VITALS
TEMPERATURE: 97.8 F | HEART RATE: 65 BPM | OXYGEN SATURATION: 98 % | BODY MASS INDEX: 39.85 KG/M2 | SYSTOLIC BLOOD PRESSURE: 102 MMHG | DIASTOLIC BLOOD PRESSURE: 56 MMHG | RESPIRATION RATE: 18 BRPM | WEIGHT: 203 LBS | HEIGHT: 60 IN

## 2023-11-09 DIAGNOSIS — I25.119 CORONARY ARTERY DISEASE INVOLVING NATIVE CORONARY ARTERY OF NATIVE HEART WITH ANGINA PECTORIS: ICD-10-CM

## 2023-11-09 LAB
ANION GAP SERPL CALCULATED.3IONS-SCNC: 9.4 MMOL/L (ref 5–15)
BUN SERPL-MCNC: 33 MG/DL (ref 8–23)
BUN/CREAT SERPL: 24.8 (ref 7–25)
CALCIUM SPEC-SCNC: 9.5 MG/DL (ref 8.6–10.5)
CHLORIDE SERPL-SCNC: 105 MMOL/L (ref 98–107)
CO2 SERPL-SCNC: 27.6 MMOL/L (ref 22–29)
CREAT SERPL-MCNC: 1.33 MG/DL (ref 0.57–1)
DEPRECATED RDW RBC AUTO: 47.1 FL (ref 37–54)
EGFRCR SERPLBLD CKD-EPI 2021: 42.3 ML/MIN/1.73
ERYTHROCYTE [DISTWIDTH] IN BLOOD BY AUTOMATED COUNT: 14.4 % (ref 12.3–15.4)
GLUCOSE SERPL-MCNC: 109 MG/DL (ref 65–99)
HCT VFR BLD AUTO: 38.9 % (ref 34–46.6)
HGB BLD-MCNC: 12.1 G/DL (ref 12–15.9)
INR PPP: 0.96 (ref 0.86–1.15)
MCH RBC QN AUTO: 27.8 PG (ref 26.6–33)
MCHC RBC AUTO-ENTMCNC: 31.1 G/DL (ref 31.5–35.7)
MCV RBC AUTO: 89.4 FL (ref 79–97)
PLATELET # BLD AUTO: 125 10*3/MM3 (ref 140–450)
PMV BLD AUTO: 11.1 FL (ref 6–12)
POTASSIUM SERPL-SCNC: 3.9 MMOL/L (ref 3.5–5.2)
PROTHROMBIN TIME: 12.9 SECONDS (ref 11.8–14.9)
RBC # BLD AUTO: 4.35 10*6/MM3 (ref 3.77–5.28)
SODIUM SERPL-SCNC: 142 MMOL/L (ref 136–145)
WBC NRBC COR # BLD: 5.61 10*3/MM3 (ref 3.4–10.8)

## 2023-11-09 PROCEDURE — 93458 L HRT ARTERY/VENTRICLE ANGIO: CPT | Performed by: INTERNAL MEDICINE

## 2023-11-09 PROCEDURE — 80048 BASIC METABOLIC PNL TOTAL CA: CPT | Performed by: INTERNAL MEDICINE

## 2023-11-09 PROCEDURE — 99152 MOD SED SAME PHYS/QHP 5/>YRS: CPT | Performed by: INTERNAL MEDICINE

## 2023-11-09 PROCEDURE — 85610 PROTHROMBIN TIME: CPT | Performed by: INTERNAL MEDICINE

## 2023-11-09 PROCEDURE — 25510000001 IOPAMIDOL PER 1 ML: Performed by: INTERNAL MEDICINE

## 2023-11-09 PROCEDURE — 25010000002 HEPARIN (PORCINE) PER 1000 UNITS: Performed by: INTERNAL MEDICINE

## 2023-11-09 PROCEDURE — C1769 GUIDE WIRE: HCPCS | Performed by: INTERNAL MEDICINE

## 2023-11-09 PROCEDURE — 85027 COMPLETE CBC AUTOMATED: CPT | Performed by: INTERNAL MEDICINE

## 2023-11-09 PROCEDURE — 25810000003 SODIUM CHLORIDE 0.9 % SOLUTION 1,000 ML FLEX CONT: Performed by: INTERNAL MEDICINE

## 2023-11-09 PROCEDURE — C1894 INTRO/SHEATH, NON-LASER: HCPCS | Performed by: INTERNAL MEDICINE

## 2023-11-09 PROCEDURE — 25010000002 FENTANYL CITRATE (PF) 100 MCG/2ML SOLUTION: Performed by: INTERNAL MEDICINE

## 2023-11-09 PROCEDURE — 25010000002 MIDAZOLAM PER 1MG: Performed by: INTERNAL MEDICINE

## 2023-11-09 RX ORDER — HEPARIN SODIUM 1000 [USP'U]/ML
INJECTION, SOLUTION INTRAVENOUS; SUBCUTANEOUS
Status: DISCONTINUED | OUTPATIENT
Start: 2023-11-09 | End: 2023-11-09 | Stop reason: HOSPADM

## 2023-11-09 RX ORDER — ACETAMINOPHEN 325 MG/1
650 TABLET ORAL EVERY 4 HOURS PRN
Status: DISCONTINUED | OUTPATIENT
Start: 2023-11-09 | End: 2023-11-09 | Stop reason: HOSPADM

## 2023-11-09 RX ORDER — NITROGLYCERIN 0.4 MG/1
0.4 TABLET SUBLINGUAL
Status: DISCONTINUED | OUTPATIENT
Start: 2023-11-09 | End: 2023-11-09 | Stop reason: HOSPADM

## 2023-11-09 RX ORDER — LIDOCAINE HYDROCHLORIDE 20 MG/ML
INJECTION, SOLUTION INFILTRATION; PERINEURAL
Status: DISCONTINUED | OUTPATIENT
Start: 2023-11-09 | End: 2023-11-09 | Stop reason: HOSPADM

## 2023-11-09 RX ORDER — SODIUM CHLORIDE 0.9 % (FLUSH) 0.9 %
10 SYRINGE (ML) INJECTION AS NEEDED
Status: DISCONTINUED | OUTPATIENT
Start: 2023-11-09 | End: 2023-11-09 | Stop reason: HOSPADM

## 2023-11-09 RX ORDER — SODIUM CHLORIDE 0.9 % (FLUSH) 0.9 %
10 SYRINGE (ML) INJECTION EVERY 12 HOURS SCHEDULED
Status: DISCONTINUED | OUTPATIENT
Start: 2023-11-09 | End: 2023-11-09 | Stop reason: HOSPADM

## 2023-11-09 RX ORDER — FENTANYL CITRATE 50 UG/ML
INJECTION, SOLUTION INTRAMUSCULAR; INTRAVENOUS
Status: DISCONTINUED | OUTPATIENT
Start: 2023-11-09 | End: 2023-11-09 | Stop reason: HOSPADM

## 2023-11-09 RX ORDER — SODIUM CHLORIDE 9 MG/ML
40 INJECTION, SOLUTION INTRAVENOUS AS NEEDED
Status: DISCONTINUED | OUTPATIENT
Start: 2023-11-09 | End: 2023-11-09 | Stop reason: HOSPADM

## 2023-11-09 RX ORDER — MIDAZOLAM HYDROCHLORIDE 2 MG/2ML
INJECTION, SOLUTION INTRAMUSCULAR; INTRAVENOUS
Status: DISCONTINUED | OUTPATIENT
Start: 2023-11-09 | End: 2023-11-09 | Stop reason: HOSPADM

## 2023-11-09 RX ADMIN — Medication 10 ML: at 08:23

## 2023-11-09 RX ADMIN — SODIUM CHLORIDE: 9 INJECTION, SOLUTION INTRAVENOUS at 07:49

## 2023-11-09 RX ADMIN — SODIUM CHLORIDE 40 ML: 9 INJECTION, SOLUTION INTRAVENOUS at 07:49

## 2023-11-09 NOTE — NURSING NOTE
VSS, no issues reported or noted, patient and spouse educated about after care and provided AVS. Both verbalized understanding. IV removed and intact. Transport took patient to spouses vehicle.

## 2023-11-09 NOTE — INTERVAL H&P NOTE
H&P reviewed. The patient was examined and there are no changes to the H&P.      Giovany Dixon MD

## (undated) DEVICE — Device

## (undated) DEVICE — SENSR O2 OXIMAX FNGR ADHS A/ 1P/U

## (undated) DEVICE — GLIDESHEATH SLENDER ACCESS KIT: Brand: GLIDESHEATH SLENDER

## (undated) DEVICE — BLD CLIP UNIV SURG GRY

## (undated) DEVICE — CATH F5INF TLPIGST145 110CM6SH: Brand: INFINITI

## (undated) DEVICE — RADIFOCUS OPTITORQUE ANGIOGRAPHIC CATHETER: Brand: OPTITORQUE

## (undated) DEVICE — RADIFOCUS GLIDEWIRE: Brand: GLIDEWIRE

## (undated) DEVICE — DECANTER: Brand: UNBRANDED

## (undated) DEVICE — GW FC FLOP/TP .035 260CM 3MM

## (undated) DEVICE — SHT AIR TRANSFR COMFRT GLIDE LAT 40X80IN